# Patient Record
Sex: MALE | Race: WHITE | NOT HISPANIC OR LATINO
[De-identification: names, ages, dates, MRNs, and addresses within clinical notes are randomized per-mention and may not be internally consistent; named-entity substitution may affect disease eponyms.]

---

## 2020-06-29 ENCOUNTER — ERX REFILL RESPONSE (OUTPATIENT)
Age: 55
End: 2020-06-29

## 2020-06-29 RX ORDER — PANTOPRAZOLE SODIUM 20 MG/1
TAKE 1 TABLET BY MOUTH EVERY DAY AS DIRECTED TABLET, DELAYED RELEASE ORAL
Qty: 90 TABLET | Refills: 4 | OUTPATIENT

## 2020-06-29 RX ORDER — PANTOPRAZOLE SODIUM 20 MG/1
TAKE 1 TABLET BY MOUTH EVERY DAY AS DIRECTED TABLET, DELAYED RELEASE ORAL
Qty: 90 | Refills: 3 | OUTPATIENT

## 2020-07-14 ENCOUNTER — OFFICE VISIT (OUTPATIENT)
Dept: URBAN - NONMETROPOLITAN AREA CLINIC 2 | Facility: CLINIC | Age: 55
End: 2020-07-14
Payer: COMMERCIAL

## 2020-07-14 DIAGNOSIS — R13.10 DYSPHAGIA: ICD-10-CM

## 2020-07-14 DIAGNOSIS — K57.92 DIVERTICULITIS: ICD-10-CM

## 2020-07-14 DIAGNOSIS — Z12.11 COLON CANCER SCREENING: ICD-10-CM

## 2020-07-14 DIAGNOSIS — K44.9 HIATAL HERNIA: ICD-10-CM

## 2020-07-14 DIAGNOSIS — K21.9 GERD (GASTROESOPHAGEAL REFLUX DISEASE): ICD-10-CM

## 2020-07-14 PROCEDURE — G8427 DOCREV CUR MEDS BY ELIG CLIN: HCPCS | Performed by: INTERNAL MEDICINE

## 2020-07-14 PROCEDURE — 99213 OFFICE O/P EST LOW 20 MIN: CPT | Performed by: INTERNAL MEDICINE

## 2020-07-14 PROCEDURE — G9903 PT SCRN TBCO ID AS NON USER: HCPCS | Performed by: INTERNAL MEDICINE

## 2020-07-14 PROCEDURE — 3017F COLORECTAL CA SCREEN DOC REV: CPT | Performed by: INTERNAL MEDICINE

## 2020-07-14 RX ORDER — PANTOPRAZOLE SODIUM 20 MG/1
TAKE 1 TABLET BY MOUTH EVERY DAY AS DIRECTED TABLET, DELAYED RELEASE ORAL
Qty: 90 TABLET | Refills: 4 | Status: ACTIVE | COMMUNITY

## 2020-07-14 NOTE — HPI-TODAY'S VISIT:
4/17/20 Mr. Llamas presents today for f/u of his diverticulitis, reflux, and EGD.  He was found to have a hiatal hernia.  He did have a ring that was dilated as well.  He denies any dysphagia or breakthrough reflux.  He is well controlled on protonix 20mg daily.  He has also not had any issues with his diverticulitis.  he is taking his fiber and his bowels are regular.  overall, he is feeling well today. 10/11/19 Mr. Llamas presents for follow up of diverticultis and reflux. His colonoscopy reveals diverticulosis and one HP. He is on low dose fiber and his bowels are normal. He has had another flare of diverticulitis.  He takes ABX, and his symptoms improve. His dysphagia is better as long as he takes the protonix. If he misses a dose, he has dysphagia and feels reflux. He does have an occasional glass of red wine in the evening. He denies any significant NSAID use. He has never had an EGD in the past.  7/14/20 The patient presents today for follow-up of his diverticulitis and reflux.  Since our last visit, he has been doing quite well.  He has not had any further episodes of diverticulitis.  He has not had to take antibiotics.  His main complaint is that he does feel that his voice has become more hoarse.  He is unsure if this is related to his reflux.  He denies any classic reflux symptoms.  He denies any heartburn.  Overall, he is feeling well today.

## 2020-09-28 ENCOUNTER — ERX REFILL RESPONSE (OUTPATIENT)
Age: 55
End: 2020-09-28

## 2020-09-29 ENCOUNTER — ERX REFILL RESPONSE (OUTPATIENT)
Age: 55
End: 2020-09-29

## 2021-07-08 ENCOUNTER — TELEPHONE ENCOUNTER (OUTPATIENT)
Dept: URBAN - NONMETROPOLITAN AREA CLINIC 2 | Facility: CLINIC | Age: 56
End: 2021-07-08

## 2021-07-08 RX ORDER — PANTOPRAZOLE SODIUM 20 MG/1
TAKE 1 TABLET BY MOUTH EVERY DAY AS DIRECTED TABLET, DELAYED RELEASE ORAL
Qty: 90 TABLET | Refills: 1

## 2021-07-14 ENCOUNTER — TELEPHONE ENCOUNTER (OUTPATIENT)
Dept: URBAN - METROPOLITAN AREA CLINIC 92 | Facility: CLINIC | Age: 56
End: 2021-07-14

## 2021-07-14 RX ORDER — PANTOPRAZOLE SODIUM 20 MG/1
1 TABLET ORAL ONCE A DAY TABLET, DELAYED RELEASE ORAL
Qty: 90 TABLET | Refills: 3

## 2021-09-21 ENCOUNTER — WEB ENCOUNTER (OUTPATIENT)
Dept: URBAN - NONMETROPOLITAN AREA CLINIC 2 | Facility: CLINIC | Age: 56
End: 2021-09-21

## 2021-09-21 ENCOUNTER — OFFICE VISIT (OUTPATIENT)
Dept: URBAN - NONMETROPOLITAN AREA CLINIC 2 | Facility: CLINIC | Age: 56
End: 2021-09-21
Payer: COMMERCIAL

## 2021-09-21 VITALS
HEIGHT: 69 IN | BODY MASS INDEX: 27.11 KG/M2 | DIASTOLIC BLOOD PRESSURE: 86 MMHG | SYSTOLIC BLOOD PRESSURE: 125 MMHG | TEMPERATURE: 97.6 F | WEIGHT: 183 LBS | HEART RATE: 52 BPM

## 2021-09-21 DIAGNOSIS — Z12.11 COLON CANCER SCREENING: ICD-10-CM

## 2021-09-21 DIAGNOSIS — K44.9 HIATAL HERNIA: ICD-10-CM

## 2021-09-21 DIAGNOSIS — K57.92 DIVERTICULITIS: ICD-10-CM

## 2021-09-21 DIAGNOSIS — K21.9 GERD (GASTROESOPHAGEAL REFLUX DISEASE): ICD-10-CM

## 2021-09-21 DIAGNOSIS — R13.10 DYSPHAGIA: ICD-10-CM

## 2021-09-21 PROCEDURE — 99214 OFFICE O/P EST MOD 30 MIN: CPT | Performed by: INTERNAL MEDICINE

## 2021-09-21 RX ORDER — PANTOPRAZOLE SODIUM 20 MG/1
1 TABLET ORAL ONCE A DAY TABLET, DELAYED RELEASE ORAL
Qty: 90 TABLET | Refills: 3

## 2021-09-21 RX ORDER — PANTOPRAZOLE SODIUM 20 MG/1
1 TABLET ORAL ONCE A DAY TABLET, DELAYED RELEASE ORAL
Qty: 90 TABLET | Refills: 3 | Status: ACTIVE | COMMUNITY

## 2021-09-21 NOTE — HPI-TODAY'S VISIT:
4/17/20 Mr. Llamas presents today for f/u of his diverticulitis, reflux, and EGD.  He was found to have a hiatal hernia.  He did have a ring that was dilated as well.  He denies any dysphagia or breakthrough reflux.  He is well controlled on protonix 20mg daily.  He has also not had any issues with his diverticulitis.  he is taking his fiber and his bowels are regular.  overall, he is feeling well today. 10/11/19 Mr. Llamas presents for follow up of diverticultis and reflux. His colonoscopy reveals diverticulosis and one HP. He is on low dose fiber and his bowels are normal. He has had another flare of diverticulitis.  He takes ABX, and his symptoms improve. His dysphagia is better as long as he takes the protonix. If he misses a dose, he has dysphagia and feels reflux. He does have an occasional glass of red wine in the evening. He denies any significant NSAID use. He has never had an EGD in the past.  7/14/20 The patient presents today for follow-up of his diverticulitis and reflux.  Since our last visit, he has been doing quite well.  He has not had any further episodes of diverticulitis.  He has not had to take antibiotics.  His main complaint is that he does feel that his voice has become more hoarse.  He is unsure if this is related to his reflux.  He denies any classic reflux symptoms.  He denies any heartburn.  Overall, he is feeling well today. 9/21/2021 Mr. Llamas presents for follow-up of reflux, hiatal hernia, and history of diverticulitis.  He is doing great on pantoprazole 20 mg daily.  His bowels are moving regularly on 3 capsules of Metamucil at night.  He said no further diverticulitis flares.  He is here for refill.  MB

## 2021-10-27 ENCOUNTER — TELEPHONE ENCOUNTER (OUTPATIENT)
Dept: URBAN - NONMETROPOLITAN AREA CLINIC 2 | Facility: CLINIC | Age: 56
End: 2021-10-27

## 2021-10-27 RX ORDER — METRONIDAZOLE 500 MG/1
1 TABLET TABLET, FILM COATED ORAL THREE TIMES A DAY
Qty: 42 TABLET | Refills: 0 | OUTPATIENT
Start: 2021-10-27 | End: 2021-11-09

## 2021-10-27 RX ORDER — PANTOPRAZOLE SODIUM 20 MG/1
1 TABLET ORAL ONCE A DAY TABLET, DELAYED RELEASE ORAL
Qty: 90 TABLET | Refills: 3 | Status: ACTIVE | COMMUNITY

## 2021-10-27 RX ORDER — CIPROFLOXACIN HYDROCHLORIDE 500 MG/1
1 TABLET TABLET, FILM COATED ORAL
Qty: 28 TABLET | Refills: 0 | OUTPATIENT
Start: 2021-10-27 | End: 2021-11-09

## 2021-12-06 ENCOUNTER — TELEPHONE ENCOUNTER (OUTPATIENT)
Dept: URBAN - METROPOLITAN AREA CLINIC 92 | Facility: CLINIC | Age: 56
End: 2021-12-06

## 2021-12-06 RX ORDER — PANTOPRAZOLE SODIUM 20 MG/1
1 TABLET ORAL ONCE A DAY TABLET, DELAYED RELEASE ORAL
Qty: 90 TABLET | Refills: 3

## 2022-01-11 ENCOUNTER — TELEPHONE ENCOUNTER (OUTPATIENT)
Dept: URBAN - NONMETROPOLITAN AREA CLINIC 13 | Facility: CLINIC | Age: 57
End: 2022-01-11

## 2022-01-11 RX ORDER — PANTOPRAZOLE SODIUM 20 MG/1
1 TABLET ORAL ONCE A DAY TABLET, DELAYED RELEASE ORAL
Qty: 90 TABLET | Refills: 3

## 2023-02-01 ENCOUNTER — TELEPHONE ENCOUNTER (OUTPATIENT)
Dept: URBAN - NONMETROPOLITAN AREA CLINIC 2 | Facility: CLINIC | Age: 58
End: 2023-02-01

## 2023-02-01 RX ORDER — PANTOPRAZOLE SODIUM 20 MG/1
1 TABLET ORAL ONCE A DAY TABLET, DELAYED RELEASE ORAL
Qty: 90 TABLET | Refills: 3

## 2023-02-06 ENCOUNTER — OFFICE VISIT (OUTPATIENT)
Dept: URBAN - NONMETROPOLITAN AREA CLINIC 2 | Facility: CLINIC | Age: 58
End: 2023-02-06

## 2023-02-16 ENCOUNTER — TELEPHONE ENCOUNTER (OUTPATIENT)
Dept: URBAN - NONMETROPOLITAN AREA CLINIC 2 | Facility: CLINIC | Age: 58
End: 2023-02-16

## 2023-02-16 RX ORDER — METRONIDAZOLE 500 MG/1
1 TABLET TABLET, FILM COATED ORAL THREE TIMES A DAY
Qty: 42 TABLET | Refills: 0
Start: 2021-10-27 | End: 2023-03-02

## 2023-02-16 RX ORDER — CIPROFLOXACIN HYDROCHLORIDE 500 MG/1
1 TABLET TABLET, FILM COATED ORAL
Qty: 28 TABLET | Refills: 0
Start: 2021-10-27 | End: 2023-03-02

## 2023-05-18 ENCOUNTER — LAB OUTSIDE AN ENCOUNTER (OUTPATIENT)
Dept: URBAN - NONMETROPOLITAN AREA CLINIC 2 | Facility: CLINIC | Age: 58
End: 2023-05-18

## 2023-05-18 ENCOUNTER — OFFICE VISIT (OUTPATIENT)
Dept: URBAN - NONMETROPOLITAN AREA CLINIC 2 | Facility: CLINIC | Age: 58
End: 2023-05-18
Payer: COMMERCIAL

## 2023-05-18 ENCOUNTER — DASHBOARD ENCOUNTERS (OUTPATIENT)
Age: 58
End: 2023-05-18

## 2023-05-18 ENCOUNTER — WEB ENCOUNTER (OUTPATIENT)
Dept: URBAN - NONMETROPOLITAN AREA CLINIC 2 | Facility: CLINIC | Age: 58
End: 2023-05-18

## 2023-05-18 VITALS
HEART RATE: 50 BPM | DIASTOLIC BLOOD PRESSURE: 95 MMHG | SYSTOLIC BLOOD PRESSURE: 138 MMHG | BODY MASS INDEX: 27.11 KG/M2 | WEIGHT: 183 LBS | HEIGHT: 69 IN

## 2023-05-18 DIAGNOSIS — K44.9 HIATAL HERNIA: ICD-10-CM

## 2023-05-18 DIAGNOSIS — R13.19 CERVICAL DYSPHAGIA: ICD-10-CM

## 2023-05-18 DIAGNOSIS — K21.9 GERD (GASTROESOPHAGEAL REFLUX DISEASE): ICD-10-CM

## 2023-05-18 DIAGNOSIS — K57.92 DIVERTICULITIS: ICD-10-CM

## 2023-05-18 PROBLEM — 428283002: Status: ACTIVE | Noted: 2023-05-18

## 2023-05-18 PROCEDURE — 99214 OFFICE O/P EST MOD 30 MIN: CPT | Performed by: INTERNAL MEDICINE

## 2023-05-18 RX ORDER — PANTOPRAZOLE SODIUM 20 MG/1
1 TABLET ORAL ONCE A DAY TABLET, DELAYED RELEASE ORAL
Qty: 90 TABLET | Refills: 3 | Status: ACTIVE | COMMUNITY

## 2023-05-18 RX ORDER — SODIUM, POTASSIUM,MAG SULFATES 17.5-3.13G
177ML SOLUTION, RECONSTITUTED, ORAL ORAL ONCE
Qty: 1 KIT | Refills: 0 | OUTPATIENT
Start: 2023-05-18

## 2023-05-18 RX ORDER — PANTOPRAZOLE SODIUM 20 MG/1
1 TABLET ORAL ONCE A DAY TABLET, DELAYED RELEASE ORAL
Qty: 90 TABLET | Refills: 3

## 2023-05-18 NOTE — HPI-TODAY'S VISIT:
4/17/20 Mr. Llamas presents today for f/u of his diverticulitis, reflux, and EGD.  He was found to have a hiatal hernia.  He did have a ring that was dilated as well.  He denies any dysphagia or breakthrough reflux.  He is well controlled on protonix 20mg daily.  He has also not had any issues with his diverticulitis.  he is taking his fiber and his bowels are regular.  overall, he is feeling well today. 10/11/19 Mr. Llamas presents for follow up of diverticultis and reflux. His colonoscopy reveals diverticulosis and one HP. He is on low dose fiber and his bowels are normal. He has had another flare of diverticulitis.  He takes ABX, and his symptoms improve. His dysphagia is better as long as he takes the protonix. If he misses a dose, he has dysphagia and feels reflux. He does have an occasional glass of red wine in the evening. He denies any significant NSAID use. He has never had an EGD in the past.  7/14/20 The patient presents today for follow-up of his diverticulitis and reflux.  Since our last visit, he has been doing quite well.  He has not had any further episodes of diverticulitis.  He has not had to take antibiotics.  His main complaint is that he does feel that his voice has become more hoarse.  He is unsure if this is related to his reflux.  He denies any classic reflux symptoms.  He denies any heartburn.  Overall, he is feeling well today. 9/21/2021 Mr. Llamas presents for follow-up of reflux, hiatal hernia, and history of diverticulitis.  He is doing great on pantoprazole 20 mg daily.  His bowels are moving regularly on 3 capsules of Metamucil at night.  He said no further diverticulitis flares.  He is here for refill.  MB 5/18/2023 Mr. Llamas returns for follow-up, recent diverticulitis flare in February of this year treated with antibiotics after he contacted the office.  During that flare he had mild to moderate left lower quadrant abdominal pain, denies any diarrhea or hematochezia fevers. As he presents today he states symptoms have completely resolved after completing the course of Cipro.  He preferred to hold off on Flagyl given unwanted side effects.  He did report some inconsistent use of 3 tablets of Metamucil, we discussed he should continue on his therapy regularly taking it nightly.  He agrees to repeat his colonoscopy this fall, history of colon polyps TA on path.  He would like a refill of his pantoprazole 20 mg which she takes at night stating this covers his symptoms completely.  He only feels heartburn or dysphagia if he skips a dose.  He denies any dysphagia, odynophagia or change in bowel habits. SP

## 2023-10-30 ENCOUNTER — TELEPHONE ENCOUNTER (OUTPATIENT)
Dept: URBAN - NONMETROPOLITAN AREA CLINIC 2 | Facility: CLINIC | Age: 58
End: 2023-10-30

## 2023-10-31 ENCOUNTER — LAB OUTSIDE AN ENCOUNTER (OUTPATIENT)
Dept: URBAN - NONMETROPOLITAN AREA CLINIC 2 | Facility: CLINIC | Age: 58
End: 2023-10-31

## 2023-11-06 ENCOUNTER — OFFICE VISIT (OUTPATIENT)
Dept: URBAN - NONMETROPOLITAN AREA SURGERY CENTER 1 | Facility: SURGERY CENTER | Age: 58
End: 2023-11-06

## 2023-12-14 ENCOUNTER — OFFICE VISIT (OUTPATIENT)
Dept: URBAN - NONMETROPOLITAN AREA CLINIC 2 | Facility: CLINIC | Age: 58
End: 2023-12-14

## 2024-03-27 ENCOUNTER — C/E W/ DIL (OUTPATIENT)
Dept: URBAN - NONMETROPOLITAN AREA SURGERY CENTER 1 | Facility: SURGERY CENTER | Age: 59
End: 2024-03-27

## 2024-03-27 RX ORDER — PANTOPRAZOLE SODIUM 20 MG/1
1 TABLET ORAL ONCE A DAY TABLET, DELAYED RELEASE ORAL
Qty: 90 TABLET | Refills: 3 | Status: ACTIVE | COMMUNITY

## 2024-03-27 RX ORDER — SODIUM, POTASSIUM,MAG SULFATES 17.5-3.13G
177ML SOLUTION, RECONSTITUTED, ORAL ORAL ONCE
Qty: 1 KIT | Refills: 0 | Status: ACTIVE | COMMUNITY
Start: 2023-05-18

## 2024-10-04 ENCOUNTER — TELEPHONE ENCOUNTER (OUTPATIENT)
Dept: URBAN - NONMETROPOLITAN AREA CLINIC 2 | Facility: CLINIC | Age: 59
End: 2024-10-04

## 2024-10-04 RX ORDER — CIPROFLOXACIN HYDROCHLORIDE 500 MG/1
1 TABLET TABLET, FILM COATED ORAL
Qty: 28 TABLET | Refills: 0 | OUTPATIENT
Start: 2024-10-04 | End: 2024-10-18

## 2024-10-04 RX ORDER — METRONIDAZOLE 500 MG/1
1 TABLET TABLET ORAL THREE TIMES A DAY
Qty: 42 TABLET | Refills: 0 | OUTPATIENT
Start: 2024-10-04 | End: 2024-10-18

## 2025-04-16 ENCOUNTER — TELEPHONE ENCOUNTER (OUTPATIENT)
Dept: URBAN - NONMETROPOLITAN AREA CLINIC 2 | Facility: CLINIC | Age: 60
End: 2025-04-16

## 2025-04-16 RX ORDER — CIPROFLOXACIN HYDROCHLORIDE 500 MG/1
1 TABLET TABLET, FILM COATED ORAL
Qty: 28 TABLET | Refills: 0 | OUTPATIENT
Start: 2025-04-17 | End: 2025-05-01

## 2025-04-17 ENCOUNTER — OFFICE VISIT (OUTPATIENT)
Dept: URBAN - NONMETROPOLITAN AREA CLINIC 2 | Facility: CLINIC | Age: 60
End: 2025-04-17
Payer: COMMERCIAL

## 2025-04-17 ENCOUNTER — TELEPHONE ENCOUNTER (OUTPATIENT)
Dept: URBAN - NONMETROPOLITAN AREA CLINIC 2 | Facility: CLINIC | Age: 60
End: 2025-04-17

## 2025-04-17 DIAGNOSIS — R13.10 DYSPHAGIA: ICD-10-CM

## 2025-04-17 DIAGNOSIS — K21.9 GERD (GASTROESOPHAGEAL REFLUX DISEASE): ICD-10-CM

## 2025-04-17 DIAGNOSIS — Z12.11 COLON CANCER SCREENING: ICD-10-CM

## 2025-04-17 DIAGNOSIS — K57.92 DIVERTICULITIS: ICD-10-CM

## 2025-04-17 DIAGNOSIS — K44.9 HIATAL HERNIA: ICD-10-CM

## 2025-04-17 DIAGNOSIS — Z86.0100 HISTORY OF COLON POLYPS: ICD-10-CM

## 2025-04-17 PROCEDURE — 99213 OFFICE O/P EST LOW 20 MIN: CPT

## 2025-04-17 RX ORDER — PANTOPRAZOLE SODIUM 20 MG/1
TAKE ONE TABLET BY MOUTH ONE TIME DAILY TABLET, DELAYED RELEASE ORAL
Qty: 90 UNSPECIFIED | Refills: 4 | Status: ACTIVE | COMMUNITY

## 2025-04-17 RX ORDER — PHENOBARBITAL, HYOSCYAMINE SULFATE, ATROPINE SULFATE, SCOPOLAMINE HYDROBROMIDE 16.2; .1037; .0194; .0065 MG/5ML; MG/5ML; MG/5ML; MG/5ML
5 ML ELIXIR ORAL EVERY 12 HOURS
Qty: 300 ML | Refills: 0 | OUTPATIENT
Start: 2025-04-17

## 2025-04-17 RX ORDER — PANTOPRAZOLE SODIUM 20 MG/1
1 TABLET ORAL ONCE A DAY TABLET, DELAYED RELEASE ORAL
Qty: 90 TABLET | Refills: 3
Start: 2025-04-17

## 2025-04-17 RX ORDER — CIPROFLOXACIN HYDROCHLORIDE 500 MG/1
1 TABLET TABLET, FILM COATED ORAL
Qty: 28 TABLET | Refills: 0 | Status: ACTIVE | COMMUNITY
Start: 2025-04-17 | End: 2025-05-01

## 2025-04-17 NOTE — HPI-TODAY'S VISIT:
4/17/20 Mr. Llamas presents today for f/u of his diverticulitis, reflux, and EGD.  He was found to have a hiatal hernia.  He did have a ring that was dilated as well.  He denies any dysphagia or breakthrough reflux.  He is well controlled on protonix 20mg daily.  He has also not had any issues with his diverticulitis.  he is taking his fiber and his bowels are regular.  overall, he is feeling well today. 10/11/19 Mr. Llamas presents for follow up of diverticultis and reflux. His colonoscopy reveals diverticulosis and one HP. He is on low dose fiber and his bowels are normal. He has had another flare of diverticulitis.  He takes ABX, and his symptoms improve. His dysphagia is better as long as he takes the protonix. If he misses a dose, he has dysphagia and feels reflux. He does have an occasional glass of red wine in the evening. He denies any significant NSAID use. He has never had an EGD in the past.  7/14/20 The patient presents today for follow-up of his diverticulitis and reflux.  Since our last visit, he has been doing quite well.  He has not had any further episodes of diverticulitis.  He has not had to take antibiotics.  His main complaint is that he does feel that his voice has become more hoarse.  He is unsure if this is related to his reflux.  He denies any classic reflux symptoms.  He denies any heartburn.  Overall, he is feeling well today. 9/21/2021 Mr. Llamas presents for follow-up of reflux, hiatal hernia, and history of diverticulitis.  He is doing great on pantoprazole 20 mg daily.  His bowels are moving regularly on 3 capsules of Metamucil at night.  He said no further diverticulitis flares.  He is here for refill.  MB 5/18/2023 Mr. Llamas returns for follow-up, recent diverticulitis flare in February of this year treated with antibiotics after he contacted the office.  During that flare he had mild to moderate left lower quadrant abdominal pain, denies any diarrhea or hematochezia fevers. As he presents today he states symptoms have completely resolved after completing the course of Cipro.  He preferred to hold off on Flagyl given unwanted side effects.  He did report some inconsistent use of 3 tablets of Metamucil, we discussed he should continue on his therapy regularly taking it nightly.  He agrees to repeat his colonoscopy this fall, history of colon polyps TA on path.  He would like a refill of his pantoprazole 20 mg which she takes at night stating this covers his symptoms completely.  He only feels heartburn or dysphagia if he skips a dose.  He denies any dysphagia, odynophagia or change in bowel habits. SP  4/17/2025 Return to clinic for follow-up for diverticulitis.  He feels he is having a current episode.  He has used a remainder of his Cipro prescription and only had 5 days and ran out.  He requested a refill and this was sent.  He prefers to avoid Flagyl due to unwanted side effects. He is experiencing a dull sharp pain which is worse with movement.  This is improving.  This is similar to what he is felt in the past.  2 years ago he had CT of abdomen pelvis during a flare and it showed mild diverticulitis.  This usually responds well to antibiotics.  He does continue his fiber supplement.  He denies fever chills nausea vomiting.  He is not having blood in stool. He would like to trial Donnatal for cramping pain as this is what his mother uses prescribed by our office.  Discussed that this may have increased risks associated and he is aware, brother is a pharmacist.  He may consider dicyclomine as well.  He denies any other GI concerns today. NADINE

## 2025-08-13 ENCOUNTER — OFFICE VISIT (OUTPATIENT)
Dept: URBAN - NONMETROPOLITAN AREA CLINIC 2 | Facility: CLINIC | Age: 60
End: 2025-08-13
Payer: COMMERCIAL

## 2025-08-13 DIAGNOSIS — Z86.0101 PERSONAL HISTORY OF ADENOMATOUS AND SERRATED COLON POLYPS: ICD-10-CM

## 2025-08-13 DIAGNOSIS — R13.10 DYSPHAGIA: ICD-10-CM

## 2025-08-13 DIAGNOSIS — K44.9 HIATAL HERNIA: ICD-10-CM

## 2025-08-13 DIAGNOSIS — Z12.11 COLON CANCER SCREENING: ICD-10-CM

## 2025-08-13 DIAGNOSIS — K21.9 GERD (GASTROESOPHAGEAL REFLUX DISEASE): ICD-10-CM

## 2025-08-13 DIAGNOSIS — K57.92 DIVERTICULITIS: ICD-10-CM

## 2025-08-13 DIAGNOSIS — R10.32 LLQ ABDOMINAL PAIN: ICD-10-CM

## 2025-08-13 PROBLEM — 428283002: Status: ACTIVE | Noted: 2025-08-13

## 2025-08-13 PROBLEM — 301716002: Status: ACTIVE | Noted: 2025-08-13

## 2025-08-13 PROCEDURE — 99214 OFFICE O/P EST MOD 30 MIN: CPT | Performed by: NURSE PRACTITIONER

## 2025-08-13 RX ORDER — METRONIDAZOLE 500 MG/1
1 TABLET TABLET ORAL THREE TIMES A DAY
Qty: 30 TABLET | Refills: 0 | OUTPATIENT
Start: 2025-08-13 | End: 2025-08-23

## 2025-08-13 RX ORDER — CIPROFLOXACIN HYDROCHLORIDE 500 MG/1
1 TABLET TABLET, FILM COATED ORAL
Qty: 20 TABLET | OUTPATIENT
Start: 2025-08-13 | End: 2025-08-23

## 2025-08-13 RX ORDER — PHENOBARBITAL, HYOSCYAMINE SULFATE, ATROPINE SULFATE, SCOPOLAMINE HYDROBROMIDE 16.2; .1037; .0194; .0065 MG/5ML; MG/5ML; MG/5ML; MG/5ML
5 ML ELIXIR ORAL EVERY 12 HOURS
Qty: 300 ML | Refills: 0 | Status: ACTIVE | COMMUNITY
Start: 2025-04-17

## 2025-08-13 RX ORDER — POLYETHYLENE GLYCOL 3350 17 G/DOSE
1/2 SCOOP OR CAPFUL MIXED WITH 8 OUNCES OF FLUID IN THE AM POWDER (GRAM) ORAL ONCE A DAY
Qty: 90 | Refills: 3 | OUTPATIENT
Start: 2025-08-13 | End: 2026-08-08

## 2025-08-13 RX ORDER — PANTOPRAZOLE SODIUM 20 MG/1
1 TABLET TABLET, DELAYED RELEASE ORAL ONCE A DAY
Qty: 90 TABLET | Refills: 3

## 2025-08-13 RX ORDER — PANTOPRAZOLE SODIUM 20 MG/1
TAKE ONE TABLET BY MOUTH ONE TIME DAILY TABLET, DELAYED RELEASE ORAL
Qty: 90 UNSPECIFIED | Refills: 4 | Status: ACTIVE | COMMUNITY

## 2025-08-13 RX ORDER — PSYLLIUM HUSK 0.4 G
2 CAPSULES AT SUPPER OR BEDTIME WITH WATER CAPSULE ORAL DAILY
Qty: 180 | Refills: 3 | OUTPATIENT
Start: 2025-08-13 | End: 2026-08-08